# Patient Record
Sex: MALE | Race: WHITE | Employment: FULL TIME | URBAN - METROPOLITAN AREA
[De-identification: names, ages, dates, MRNs, and addresses within clinical notes are randomized per-mention and may not be internally consistent; named-entity substitution may affect disease eponyms.]

---

## 2018-11-26 ENCOUNTER — HOSPITAL ENCOUNTER (EMERGENCY)
Age: 63
Discharge: HOME | End: 2018-11-26
Attending: EMERGENCY MEDICINE
Payer: COMMERCIAL

## 2018-11-26 VITALS
TEMPERATURE: 97.8 F | DIASTOLIC BLOOD PRESSURE: 79 MMHG | OXYGEN SATURATION: 98 % | WEIGHT: 205 LBS | HEART RATE: 81 BPM | BODY MASS INDEX: 25.49 KG/M2 | RESPIRATION RATE: 18 BRPM | SYSTOLIC BLOOD PRESSURE: 134 MMHG | HEIGHT: 75 IN

## 2018-11-26 DIAGNOSIS — G58.9 MONONEUROPATHY: Primary | ICD-10-CM

## 2018-11-26 LAB
ALBUMIN SERPL-MCNC: 3.3 G/DL (ref 3.2–4.6)
ALBUMIN/GLOB SERPL: 0.8 {RATIO} (ref 1.2–3.5)
ALP SERPL-CCNC: 112 U/L (ref 50–136)
ALT SERPL-CCNC: 19 U/L (ref 12–65)
ANION GAP SERPL CALC-SCNC: 6 MMOL/L (ref 7–16)
AST SERPL-CCNC: 18 U/L (ref 15–37)
BASOPHILS # BLD: 0 K/UL (ref 0–0.2)
BASOPHILS NFR BLD: 0 % (ref 0–2)
BILIRUB SERPL-MCNC: 0.3 MG/DL (ref 0.2–1.1)
BUN SERPL-MCNC: 18 MG/DL (ref 8–23)
CALCIUM SERPL-MCNC: 8.7 MG/DL (ref 8.3–10.4)
CHLORIDE SERPL-SCNC: 108 MMOL/L (ref 98–107)
CO2 SERPL-SCNC: 27 MMOL/L (ref 21–32)
CREAT SERPL-MCNC: 0.66 MG/DL (ref 0.8–1.5)
DIFFERENTIAL METHOD BLD: NORMAL
EOSINOPHIL # BLD: 0.2 K/UL (ref 0–0.8)
EOSINOPHIL NFR BLD: 2 % (ref 0.5–7.8)
ERYTHROCYTE [DISTWIDTH] IN BLOOD BY AUTOMATED COUNT: 14.4 %
GLOBULIN SER CALC-MCNC: 3.9 G/DL (ref 2.3–3.5)
GLUCOSE SERPL-MCNC: 88 MG/DL (ref 65–100)
HCT VFR BLD AUTO: 45.4 % (ref 41.1–50.3)
HGB BLD-MCNC: 15 G/DL (ref 13.6–17.2)
IMM GRANULOCYTES # BLD: 0 K/UL (ref 0–0.5)
IMM GRANULOCYTES NFR BLD AUTO: 0 % (ref 0–5)
LYMPHOCYTES # BLD: 1.9 K/UL (ref 0.5–4.6)
LYMPHOCYTES NFR BLD: 21 % (ref 13–44)
MCH RBC QN AUTO: 31.5 PG (ref 26.1–32.9)
MCHC RBC AUTO-ENTMCNC: 33 G/DL (ref 31.4–35)
MCV RBC AUTO: 95.4 FL (ref 79.6–97.8)
MONOCYTES # BLD: 1 K/UL (ref 0.1–1.3)
MONOCYTES NFR BLD: 11 % (ref 4–12)
NEUTS SEG # BLD: 6 K/UL (ref 1.7–8.2)
NEUTS SEG NFR BLD: 66 % (ref 43–78)
NRBC # BLD: 0 K/UL (ref 0–0.2)
PLATELET # BLD AUTO: 219 K/UL (ref 150–450)
PMV BLD AUTO: 9.7 FL (ref 9.4–12.3)
POTASSIUM SERPL-SCNC: 4.4 MMOL/L (ref 3.5–5.1)
PROT SERPL-MCNC: 7.2 G/DL (ref 6.3–8.2)
RBC # BLD AUTO: 4.76 M/UL (ref 4.23–5.6)
SODIUM SERPL-SCNC: 141 MMOL/L (ref 136–145)
WBC # BLD AUTO: 9.1 K/UL (ref 4.3–11.1)

## 2018-11-26 PROCEDURE — 80053 COMPREHEN METABOLIC PANEL: CPT

## 2018-11-26 PROCEDURE — 99283 EMERGENCY DEPT VISIT LOW MDM: CPT | Performed by: EMERGENCY MEDICINE

## 2018-11-26 PROCEDURE — 85025 COMPLETE CBC W/AUTO DIFF WBC: CPT

## 2018-11-26 RX ORDER — HYDROCODONE BITARTRATE AND ACETAMINOPHEN 5; 325 MG/1; MG/1
1 TABLET ORAL
Qty: 15 TAB | Refills: 0 | Status: SHIPPED | OUTPATIENT
Start: 2018-11-26 | End: 2019-12-24

## 2018-11-26 NOTE — ED PROVIDER NOTES
Patient states he has been having right foot tingling and appears seizures off and on for the past several months. He states it has been constant for the last couple weeks. He denies any trauma or precipitating event. He went to an urgent care recently and was prescribed Ultram for his problem. He has been taking this without any improvement in his symptoms. He is not a diabetic, states he smokes cigarettes. He denies any redness or swelling to his foot. He states his symptoms are constant and worse when he walks when he plays golf. Elements of this note were created using speech recognition software. As such, errors of speech recognition may be present. History reviewed. No pertinent past medical history. History reviewed. No pertinent surgical history. History reviewed. No pertinent family history. Social History Socioeconomic History  Marital status: SINGLE Spouse name: Not on file  Number of children: Not on file  Years of education: Not on file  Highest education level: Not on file Social Needs  Financial resource strain: Not on file  Food insecurity - worry: Not on file  Food insecurity - inability: Not on file  Transportation needs - medical: Not on file  Transportation needs - non-medical: Not on file Occupational History  Not on file Tobacco Use  Smoking status: Current Every Day Smoker Types: Cigarettes  Smokeless tobacco: Never Used Substance and Sexual Activity  Alcohol use: Yes  Drug use: No  
 Sexual activity: Not on file Other Topics Concern  Not on file Social History Narrative  Not on file ALLERGIES: Patient has no known allergies. Review of Systems Constitutional: Negative for chills and fever. Gastrointestinal: Negative for nausea and vomiting. All other systems reviewed and are negative. Vitals:  
 11/26/18 1254 BP: 135/80 Pulse: 79 Resp: 20  
 Temp: 97.7 °F (36.5 °C) SpO2: 97% Weight: 93 kg (205 lb) Height: 6' 3\" (1.905 m) Physical Exam  
Constitutional: He is oriented to person, place, and time. He appears well-developed and well-nourished. HENT:  
Head: Normocephalic and atraumatic. Eyes: Conjunctivae are normal. Pupils are equal, round, and reactive to light. Neck: Normal range of motion. Neck supple. Cardiovascular: Intact distal pulses. Right foot with palpable posterior tibialis pulse. Dorsalis pedal pulse detected by Doppler, equal on right and left feet. Right foot is warm with normal coloration Musculoskeletal: Normal range of motion. He exhibits no edema. Neurological: He is alert and oriented to person, place, and time. Skin: Skin is warm and dry. Nursing note and vitals reviewed. MDM Number of Diagnoses or Management Options Mononeuropathy: new and requires workup Diagnosis management comments: 4:22 PM Discussed results with patient, unremarkable labs. He has a primary doctor he can follow up with. Amount and/or Complexity of Data Reviewed Clinical lab tests: ordered and reviewed Risk of Complications, Morbidity, and/or Mortality Presenting problems: moderate Diagnostic procedures: moderate Management options: moderate Patient Progress Patient progress: stable Procedures

## 2018-11-26 NOTE — ED TRIAGE NOTES
Pt to ed with c/o right foot numbness x1 month - pt reports that the it is a constant burning and numb feeling to his foot - pt denies any injuries to the area - pt reports that the burning is worse with ambulation - pt alert and oriented x3

## 2018-11-26 NOTE — ED NOTES
I have reviewed discharge instructions with the patient. The patient verbalized understanding. Patient left ED via Discharge Method: ambulatory to Home with self. Opportunity for questions and clarification provided. Patient given 1 scripts. No e-sign. To continue your aftercare when you leave the hospital, you may receive an automated call from our care team to check in on how you are doing. This is a free service and part of our promise to provide the best care and service to meet your aftercare needs.  If you have questions, or wish to unsubscribe from this service please call [phone]. Thank you for Choosing our [facility] Emergency Department.

## 2018-11-26 NOTE — DISCHARGE INSTRUCTIONS
Follow-up with your doctor. Call the office to make an appointment. Return to the emergency department if your symptoms worsen. You can take Motrin 600 mg every 6 hours as needed for pain in addition to the prescription medication.

## 2018-11-29 ENCOUNTER — HOSPITAL ENCOUNTER (OUTPATIENT)
Dept: ULTRASOUND IMAGING | Age: 63
Discharge: HOME | End: 2018-11-29
Attending: FAMILY MEDICINE
Payer: COMMERCIAL

## 2018-11-29 DIAGNOSIS — M79.604 PAIN OF RIGHT LOWER EXTREMITY: ICD-10-CM

## 2018-11-29 PROCEDURE — 93926 LOWER EXTREMITY STUDY: CPT

## 2019-12-24 ENCOUNTER — APPOINTMENT (OUTPATIENT)
Dept: ULTRASOUND IMAGING | Age: 64
End: 2019-12-24
Attending: EMERGENCY MEDICINE
Payer: COMMERCIAL

## 2019-12-24 ENCOUNTER — HOSPITAL ENCOUNTER (EMERGENCY)
Age: 64
Discharge: OTHER INSTITUTION | End: 2019-12-24
Attending: EMERGENCY MEDICINE
Payer: COMMERCIAL

## 2019-12-24 VITALS
SYSTOLIC BLOOD PRESSURE: 135 MMHG | DIASTOLIC BLOOD PRESSURE: 77 MMHG | WEIGHT: 190 LBS | HEART RATE: 97 BPM | BODY MASS INDEX: 23.62 KG/M2 | TEMPERATURE: 97.9 F | OXYGEN SATURATION: 95 % | HEIGHT: 75 IN

## 2019-12-24 DIAGNOSIS — I70.209 ARTERIAL OCCLUSION, LOWER EXTREMITY (HCC): Primary | ICD-10-CM

## 2019-12-24 LAB
ALBUMIN SERPL-MCNC: 3.2 G/DL (ref 3.2–4.6)
ALBUMIN/GLOB SERPL: 0.9 {RATIO} (ref 1.2–3.5)
ALP SERPL-CCNC: 96 U/L (ref 50–136)
ALT SERPL-CCNC: 23 U/L (ref 12–65)
ANION GAP SERPL CALC-SCNC: 5 MMOL/L (ref 7–16)
AST SERPL-CCNC: 50 U/L (ref 15–37)
BASOPHILS # BLD: 0.1 K/UL (ref 0–0.2)
BASOPHILS NFR BLD: 1 % (ref 0–2)
BILIRUB SERPL-MCNC: 0.3 MG/DL (ref 0.2–1.1)
BUN SERPL-MCNC: 23 MG/DL (ref 8–23)
CALCIUM SERPL-MCNC: 8.6 MG/DL (ref 8.3–10.4)
CHLORIDE SERPL-SCNC: 108 MMOL/L (ref 98–107)
CO2 SERPL-SCNC: 26 MMOL/L (ref 21–32)
CREAT SERPL-MCNC: 0.95 MG/DL (ref 0.8–1.5)
DIFFERENTIAL METHOD BLD: NORMAL
EOSINOPHIL # BLD: 0.1 K/UL (ref 0–0.8)
EOSINOPHIL NFR BLD: 2 % (ref 0.5–7.8)
ERYTHROCYTE [DISTWIDTH] IN BLOOD BY AUTOMATED COUNT: 14.4 % (ref 11.9–14.6)
GLOBULIN SER CALC-MCNC: 3.5 G/DL (ref 2.3–3.5)
GLUCOSE SERPL-MCNC: 107 MG/DL (ref 65–100)
HCT VFR BLD AUTO: 44 % (ref 41.1–50.3)
HGB BLD-MCNC: 14.3 G/DL (ref 13.6–17.2)
IMM GRANULOCYTES # BLD AUTO: 0 K/UL (ref 0–0.5)
IMM GRANULOCYTES NFR BLD AUTO: 0 % (ref 0–5)
LYMPHOCYTES # BLD: 1.6 K/UL (ref 0.5–4.6)
LYMPHOCYTES NFR BLD: 18 % (ref 13–44)
MCH RBC QN AUTO: 30.9 PG (ref 26.1–32.9)
MCHC RBC AUTO-ENTMCNC: 32.5 G/DL (ref 31.4–35)
MCV RBC AUTO: 95 FL (ref 79.6–97.8)
MONOCYTES # BLD: 1 K/UL (ref 0.1–1.3)
MONOCYTES NFR BLD: 11 % (ref 4–12)
NEUTS SEG # BLD: 5.9 K/UL (ref 1.7–8.2)
NEUTS SEG NFR BLD: 68 % (ref 43–78)
NRBC # BLD: 0 K/UL (ref 0–0.2)
PLATELET # BLD AUTO: 203 K/UL (ref 150–450)
PMV BLD AUTO: 9.7 FL (ref 9.4–12.3)
POTASSIUM SERPL-SCNC: 4.3 MMOL/L (ref 3.5–5.1)
PROT SERPL-MCNC: 6.7 G/DL (ref 6.3–8.2)
RBC # BLD AUTO: 4.63 M/UL (ref 4.23–5.6)
SODIUM SERPL-SCNC: 139 MMOL/L (ref 136–145)
WBC # BLD AUTO: 8.6 K/UL (ref 4.3–11.1)

## 2019-12-24 PROCEDURE — 93922 UPR/L XTREMITY ART 2 LEVELS: CPT

## 2019-12-24 PROCEDURE — 80053 COMPREHEN METABOLIC PANEL: CPT

## 2019-12-24 PROCEDURE — 99284 EMERGENCY DEPT VISIT MOD MDM: CPT | Performed by: EMERGENCY MEDICINE

## 2019-12-24 PROCEDURE — 85025 COMPLETE CBC W/AUTO DIFF WBC: CPT

## 2019-12-24 NOTE — ED TRIAGE NOTES
Pt states he has left leg pain, states the leg is cool to touch, hx of vascular surgery in right leg in 12/2018.

## 2019-12-24 NOTE — ED PROVIDER NOTES
Patient with peripheral vascular disease. December 2018 had surgery for right lower extremity arterial occlusion. Yesterday had left lower leg pain along with coldness and unable to feel pulses. Symptoms continued all night to this morning so came in. No chest pain or shortness of breath. No abdominal pain. Similar to previous episode in the right lower extremity. The history is provided by the patient. No  was used. Leg Pain    This is a new problem. The current episode started yesterday. The problem occurs constantly. The problem has been gradually worsening. The pain is present in the left lower leg. The quality of the pain is described as aching. The pain is moderate. Pertinent negatives include no numbness, full range of motion, no back pain and no neck pain. The symptoms are aggravated by palpation and standing. He has tried nothing for the symptoms. There has been no history of extremity trauma. No past medical history on file. No past surgical history on file. No family history on file. Social History     Socioeconomic History    Marital status: SINGLE     Spouse name: Not on file    Number of children: Not on file    Years of education: Not on file    Highest education level: Not on file   Occupational History    Not on file   Social Needs    Financial resource strain: Not on file    Food insecurity:     Worry: Not on file     Inability: Not on file    Transportation needs:     Medical: Not on file     Non-medical: Not on file   Tobacco Use    Smoking status: Current Every Day Smoker     Types: Cigarettes    Smokeless tobacco: Never Used   Substance and Sexual Activity    Alcohol use:  Yes    Drug use: No    Sexual activity: Not on file   Lifestyle    Physical activity:     Days per week: Not on file     Minutes per session: Not on file    Stress: Not on file   Relationships    Social connections:     Talks on phone: Not on file     Gets together: Not on file     Attends [employer] service: Not on file     Active member of club or organization: Not on file     Attends meetings of clubs or organizations: Not on file     Relationship status: Not on file    Intimate partner violence:     Fear of current or ex partner: Not on file     Emotionally abused: Not on file     Physically abused: Not on file     Forced sexual activity: Not on file   Other Topics Concern    Not on file   Social History Narrative    Not on file         ALLERGIES: Patient has no known allergies. Review of Systems   Constitutional: Negative for chills and fever. HENT: Negative for rhinorrhea and sore throat. Eyes: Negative for pain and redness. Respiratory: Negative for chest tightness, shortness of breath and wheezing. Cardiovascular: Negative for chest pain and leg swelling. Gastrointestinal: Negative for abdominal pain, diarrhea, nausea and vomiting. Genitourinary: Negative for dysuria and hematuria. Musculoskeletal: Negative for back pain, gait problem, neck pain and neck stiffness. Skin: Negative for color change and rash. Neurological: Negative for weakness, numbness and headaches. Vitals:    12/24/19 0734 12/24/19 0735 12/24/19 0737   BP: 121/76     Temp:  97.9 °F (36.6 °C)    SpO2:   97%   Weight:  86.2 kg (190 lb)    Height:  6' 3\" (1.905 m)             Physical Exam  Constitutional:       Appearance: Normal appearance. He is well-developed. HENT:      Head: Normocephalic and atraumatic. Neck:      Musculoskeletal: Normal range of motion and neck supple. Cardiovascular:      Rate and Rhythm: Normal rate and regular rhythm. Pulmonary:      Effort: Pulmonary effort is normal.      Breath sounds: Normal breath sounds. Abdominal:      General: Bowel sounds are normal.      Palpations: Abdomen is soft. Tenderness: There is no tenderness. Musculoskeletal: Normal range of motion. Skin:     General: Skin is dry.       Comments: VANNESAE cold to touch. Unable to palpate pedal pulse. Cap refill slowed at all toes. TTP in left calf. Neurological:      General: No focal deficit present. Mental Status: He is alert and oriented to person, place, and time. MDM  Number of Diagnoses or Management Options  Diagnosis management comments: Ultrasound shows left lower extremity proximal arterial occlusion. Discussed with Dr. Remy Harper at [address] who did patient's previous surgery and request transfer to the ER for further evaluation and possible surgery today. Amount and/or Complexity of Data Reviewed  Clinical lab tests: ordered and reviewed  Tests in the radiology section of CPT®: ordered and reviewed    Patient Progress  Patient progress: stable         Procedures          Results Include:    Recent Results (from the past 24 hour(s))   CBC WITH AUTOMATED DIFF    Collection Time: 12/24/19  7:53 AM   Result Value Ref Range    WBC 8.6 4.3 - 11.1 K/uL    RBC 4.63 4.23 - 5.6 M/uL    HGB 14.3 13.6 - 17.2 g/dL    HCT 44.0 41.1 - 50.3 %    MCV 95.0 79.6 - 97.8 FL    MCH 30.9 26.1 - 32.9 PG    MCHC 32.5 31.4 - 35.0 g/dL    RDW 14.4 11.9 - 14.6 %    PLATELET 479 000 - 237 K/uL    MPV 9.7 9.4 - 12.3 FL    ABSOLUTE NRBC 0.00 0.0 - 0.2 K/uL    DF AUTOMATED      NEUTROPHILS 68 43 - 78 %    LYMPHOCYTES 18 13 - 44 %    MONOCYTES 11 4.0 - 12.0 %    EOSINOPHILS 2 0.5 - 7.8 %    BASOPHILS 1 0.0 - 2.0 %    IMMATURE GRANULOCYTES 0 0.0 - 5.0 %    ABS. NEUTROPHILS 5.9 1.7 - 8.2 K/UL    ABS. LYMPHOCYTES 1.6 0.5 - 4.6 K/UL    ABS. MONOCYTES 1.0 0.1 - 1.3 K/UL    ABS. EOSINOPHILS 0.1 0.0 - 0.8 K/UL    ABS. BASOPHILS 0.1 0.0 - 0.2 K/UL    ABS. IMM.  GRANS. 0.0 0.0 - 0.5 K/UL   METABOLIC PANEL, COMPREHENSIVE    Collection Time: 12/24/19  7:53 AM   Result Value Ref Range    Sodium 139 136 - 145 mmol/L    Potassium 4.3 3.5 - 5.1 mmol/L    Chloride 108 (H) 98 - 107 mmol/L    CO2 26 21 - 32 mmol/L    Anion gap 5 (L) 7 - 16 mmol/L    Glucose 107 (H) 65 - 100 mg/dL BUN 23 8 - 23 MG/DL    Creatinine 0.95 0.8 - 1.5 MG/DL    GFR est AA >60 >60 ml/min/1.73m2    GFR est non-AA >60 >60 ml/min/1.73m2    Calcium 8.6 8.3 - 10.4 MG/DL    Bilirubin, total 0.3 0.2 - 1.1 MG/DL    ALT (SGPT) 23 12 - 65 U/L    AST (SGOT) 50 (H) 15 - 37 U/L    Alk. phosphatase 96 50 - 136 U/L    Protein, total 6.7 6.3 - 8.2 g/dL    Albumin 3.2 3.2 - 4.6 g/dL    Globulin 3.5 2.3 - 3.5 g/dL    A-G Ratio 0.9 (L) 1.2 - 3.5        US shows LLE proximal arterial occlusion.

## 2019-12-24 NOTE — ED NOTES
Pt transferred to [facility] ED by [address] [facility]

## 2021-09-23 ENCOUNTER — HOSPITAL ENCOUNTER (INPATIENT)
Age: 66
LOS: 1 days | Discharge: ANOTHER HOSPITAL | DRG: 301 | End: 2021-09-23
Attending: EMERGENCY MEDICINE | Admitting: SURGERY
Payer: COMMERCIAL

## 2021-09-23 ENCOUNTER — APPOINTMENT (OUTPATIENT)
Dept: ULTRASOUND IMAGING | Age: 66
DRG: 301 | End: 2021-09-23
Attending: EMERGENCY MEDICINE
Payer: COMMERCIAL

## 2021-09-23 VITALS
HEART RATE: 64 BPM | HEIGHT: 75 IN | DIASTOLIC BLOOD PRESSURE: 64 MMHG | RESPIRATION RATE: 18 BRPM | TEMPERATURE: 98.3 F | OXYGEN SATURATION: 93 % | WEIGHT: 220 LBS | SYSTOLIC BLOOD PRESSURE: 127 MMHG | BODY MASS INDEX: 27.35 KG/M2

## 2021-09-23 PROBLEM — I73.9 PAD (PERIPHERAL ARTERY DISEASE) (HCC): Status: ACTIVE | Noted: 2021-09-23

## 2021-09-23 PROBLEM — I99.8 ISCHEMIA OF LEFT LOWER EXTREMITY: Status: ACTIVE | Noted: 2021-09-23

## 2021-09-23 LAB
ALBUMIN SERPL-MCNC: 3.3 G/DL (ref 3.2–4.6)
ALBUMIN/GLOB SERPL: 0.8 {RATIO} (ref 1.2–3.5)
ALP SERPL-CCNC: 95 U/L (ref 50–136)
ALT SERPL-CCNC: 14 U/L (ref 12–65)
ANION GAP SERPL CALC-SCNC: 6 MMOL/L (ref 7–16)
APTT PPP: 26.7 SEC (ref 24.1–35.1)
AST SERPL-CCNC: 11 U/L (ref 15–37)
BASOPHILS # BLD: 0.1 K/UL (ref 0–0.2)
BASOPHILS NFR BLD: 1 % (ref 0–2)
BILIRUB SERPL-MCNC: 0.4 MG/DL (ref 0.2–1.1)
BUN SERPL-MCNC: 13 MG/DL (ref 8–23)
CALCIUM SERPL-MCNC: 9 MG/DL (ref 8.3–10.4)
CHLORIDE SERPL-SCNC: 107 MMOL/L (ref 98–107)
CO2 SERPL-SCNC: 27 MMOL/L (ref 21–32)
CREAT SERPL-MCNC: 0.56 MG/DL (ref 0.8–1.5)
DIFFERENTIAL METHOD BLD: ABNORMAL
EOSINOPHIL # BLD: 0.1 K/UL (ref 0–0.8)
EOSINOPHIL NFR BLD: 1 % (ref 0.5–7.8)
ERYTHROCYTE [DISTWIDTH] IN BLOOD BY AUTOMATED COUNT: 14.5 % (ref 11.9–14.6)
GLOBULIN SER CALC-MCNC: 3.9 G/DL (ref 2.3–3.5)
GLUCOSE SERPL-MCNC: 83 MG/DL (ref 65–100)
HCT VFR BLD AUTO: 47.4 % (ref 41.1–50.3)
HGB BLD-MCNC: 15.3 G/DL (ref 13.6–17.2)
IMM GRANULOCYTES # BLD AUTO: 0 K/UL (ref 0–0.5)
IMM GRANULOCYTES NFR BLD AUTO: 0 % (ref 0–5)
LYMPHOCYTES # BLD: 2.1 K/UL (ref 0.5–4.6)
LYMPHOCYTES NFR BLD: 24 % (ref 13–44)
MCH RBC QN AUTO: 31.8 PG (ref 26.1–32.9)
MCHC RBC AUTO-ENTMCNC: 32.3 G/DL (ref 31.4–35)
MCV RBC AUTO: 98.5 FL (ref 79.6–97.8)
MONOCYTES # BLD: 0.8 K/UL (ref 0.1–1.3)
MONOCYTES NFR BLD: 9 % (ref 4–12)
NEUTS SEG # BLD: 5.8 K/UL (ref 1.7–8.2)
NEUTS SEG NFR BLD: 65 % (ref 43–78)
NRBC # BLD: 0 K/UL (ref 0–0.2)
PLATELET # BLD AUTO: 226 K/UL (ref 150–450)
PMV BLD AUTO: 9.8 FL (ref 9.4–12.3)
POTASSIUM SERPL-SCNC: 4.2 MMOL/L (ref 3.5–5.1)
PROT SERPL-MCNC: 7.2 G/DL (ref 6.3–8.2)
RBC # BLD AUTO: 4.81 M/UL (ref 4.23–5.6)
SODIUM SERPL-SCNC: 140 MMOL/L (ref 138–145)
WBC # BLD AUTO: 8.9 K/UL (ref 4.3–11.1)

## 2021-09-23 PROCEDURE — 80053 COMPREHEN METABOLIC PANEL: CPT

## 2021-09-23 PROCEDURE — 99283 EMERGENCY DEPT VISIT LOW MDM: CPT

## 2021-09-23 PROCEDURE — 85025 COMPLETE CBC W/AUTO DIFF WBC: CPT

## 2021-09-23 PROCEDURE — 93922 UPR/L XTREMITY ART 2 LEVELS: CPT

## 2021-09-23 PROCEDURE — 74011250636 HC RX REV CODE- 250/636: Performed by: EMERGENCY MEDICINE

## 2021-09-23 PROCEDURE — 65270000029 HC RM PRIVATE

## 2021-09-23 PROCEDURE — 85730 THROMBOPLASTIN TIME PARTIAL: CPT

## 2021-09-23 RX ORDER — HYDROMORPHONE HYDROCHLORIDE 1 MG/ML
1 INJECTION, SOLUTION INTRAMUSCULAR; INTRAVENOUS; SUBCUTANEOUS
Status: COMPLETED | OUTPATIENT
Start: 2021-09-23 | End: 2021-09-23

## 2021-09-23 RX ORDER — HEPARIN SODIUM 5000 [USP'U]/ML
80 INJECTION, SOLUTION INTRAVENOUS; SUBCUTANEOUS ONCE
Status: COMPLETED | OUTPATIENT
Start: 2021-09-23 | End: 2021-09-23

## 2021-09-23 RX ORDER — HEPARIN SODIUM 5000 [USP'U]/100ML
18-36 INJECTION, SOLUTION INTRAVENOUS
Status: DISCONTINUED | OUTPATIENT
Start: 2021-09-23 | End: 2021-09-23 | Stop reason: HOSPADM

## 2021-09-23 RX ORDER — POLYETHYLENE GLYCOL 3350 17 G/17G
17 POWDER, FOR SOLUTION ORAL DAILY PRN
Status: DISCONTINUED | OUTPATIENT
Start: 2021-09-23 | End: 2021-09-23

## 2021-09-23 RX ORDER — SODIUM CHLORIDE 0.9 % (FLUSH) 0.9 %
5-40 SYRINGE (ML) INJECTION EVERY 8 HOURS
Status: DISCONTINUED | OUTPATIENT
Start: 2021-09-23 | End: 2021-09-23 | Stop reason: HOSPADM

## 2021-09-23 RX ORDER — ONDANSETRON 2 MG/ML
4 INJECTION INTRAMUSCULAR; INTRAVENOUS
Status: COMPLETED | OUTPATIENT
Start: 2021-09-23 | End: 2021-09-23

## 2021-09-23 RX ORDER — GUAIFENESIN 100 MG/5ML
81 LIQUID (ML) ORAL DAILY
Status: DISCONTINUED | OUTPATIENT
Start: 2021-09-24 | End: 2021-09-23

## 2021-09-23 RX ORDER — SODIUM CHLORIDE 0.9 % (FLUSH) 0.9 %
5-40 SYRINGE (ML) INJECTION AS NEEDED
Status: DISCONTINUED | OUTPATIENT
Start: 2021-09-23 | End: 2021-09-23

## 2021-09-23 RX ORDER — ACETAMINOPHEN 650 MG/1
650 SUPPOSITORY RECTAL
Status: DISCONTINUED | OUTPATIENT
Start: 2021-09-23 | End: 2021-09-23

## 2021-09-23 RX ORDER — ONDANSETRON 4 MG/1
4 TABLET, ORALLY DISINTEGRATING ORAL
Status: DISCONTINUED | OUTPATIENT
Start: 2021-09-23 | End: 2021-09-23

## 2021-09-23 RX ORDER — ONDANSETRON 2 MG/ML
4 INJECTION INTRAMUSCULAR; INTRAVENOUS
Status: DISCONTINUED | OUTPATIENT
Start: 2021-09-23 | End: 2021-09-23

## 2021-09-23 RX ORDER — ACETAMINOPHEN 325 MG/1
650 TABLET ORAL
Status: DISCONTINUED | OUTPATIENT
Start: 2021-09-23 | End: 2021-09-23

## 2021-09-23 RX ADMIN — ONDANSETRON 4 MG: 2 INJECTION INTRAMUSCULAR; INTRAVENOUS at 17:53

## 2021-09-23 RX ADMIN — HEPARIN SODIUM AND DEXTROSE 18 UNITS/KG/HR: 5000; 5 INJECTION INTRAVENOUS at 17:58

## 2021-09-23 RX ADMIN — HEPARIN SODIUM 8000 UNITS: 5000 INJECTION INTRAVENOUS; SUBCUTANEOUS at 17:57

## 2021-09-23 RX ADMIN — HYDROMORPHONE HYDROCHLORIDE 1 MG: 1 INJECTION, SOLUTION INTRAMUSCULAR; INTRAVENOUS; SUBCUTANEOUS at 17:52

## 2021-09-23 NOTE — Clinical Note
Status[de-identified] INPATIENT [101]   Type of Bed: Medical [8]   Inpatient Hospitalization Certified Necessary for the Following Reasons: 3.  Patient receiving treatment that can only be provided in an inpatient setting (further clarification in H&P documentation)   Admitting Diagnosis: PAD (peripheral artery disease) [facility]) [[id number]]   Admitting Diagnosis: Ischemia of left lower extremity [[id number]]   Admitting Physician: Elsa Butts   Attending Physician: Elsa Butts   Estimated Length of Stay: 2 Midnights   Discharge Plan[de-identified] Home with Office Follow-up

## 2021-09-23 NOTE — H&P
[address]   [address] [facility] [address] FAX: [phone]    Vascular Surgery Consult    Subjective:      Tish Marino is a 61 y.o. male who presented to the ER for evaluation of left leg pain, numbness and coldness that started yesterday around 2 pm. He is followed by Dr. Sara Myers at [facility]. He apparently called their office but was advised that he needed to present to the urgent care as they could not see him in the office. He has undergone a right fem-pop bypass with GSV and left SFA stenosis s/p radial access with right bypass outflow 4mm angioplasty and left SFA 5mm angioplasty in Nov 2020. He had been on Coumadin and Plavix but it has been discontinued and he is only on ASA. He unfortuntaly continues to smoke and has no desire to stop. He is having extreme pain and numbness. He does get some mild improvement to his pain when he dangles his left leg off the bed. He denies COVID-19 and has not been vaccinated. PMH: PAD s/p bypass and anioplasty, Osgood-Schlatter's disease      No past medical history on file. No past surgical history on file. No family history on file. Social History     Socioeconomic History    Marital status: SINGLE     Spouse name: Not on file    Number of children: Not on file    Years of education: Not on file    Highest education level: Not on file   Tobacco Use    Smoking status: Current Every Day Smoker     Types: Cigarettes    Smokeless tobacco: Never Used   Substance and Sexual Activity    Alcohol use: Yes    Drug use: No     Social Determinants of Health     Financial Resource Strain:     Difficulty of Paying Living Expenses:    Food Insecurity:     Worried About Running Out of Food in the Last Year:     [address] in the Last Year:    Transportation Needs:     Lack of Transportation (Medical):      Lack of Transportation (Non-Medical):    Physical Activity:     Days of Exercise per Week:     Minutes of Exercise per Session:    Stress:     Feeling of Stress :    Social Connections:     Frequency of Communication with Friends and Family:     Frequency of Social Gatherings with Friends and Family:     Attends Christian Services:     Active Member of Clubs or Organizations:     Attends Club or Organization Meetings:     Marital Status:       Current Facility-Administered Medications   Medication Dose Route Frequency    heparin (porcine) injection 8,000 Units  80 Units/kg IntraVENous ONCE    heparin 25,000 units in dextrose 500 mL infusion  18-36 Units/kg/hr IntraVENous TITRATE     No current outpatient medications on file. No Known Allergies    Review of Systems:  A comprehensive review of systems was negative except for that written in the History of Present Illness. Objective:     Patient Vitals for the past 8 hrs:   BP Temp Pulse Resp SpO2 Height Weight   21 1500 116/81 97.8 °F (36.6 °C) 77 18 97 % 6' 3\" (1.905 m) 220 lb (99.8 kg)     Temp (24hrs), Av.8 °F (36.6 °C), Min:97.8 °F (36.6 °C), Max:97.8 °F (36.6 °C)      Physical Exam:  GENERAL: alert, cooperative, no distress, appears stated age, LUNG: clear to auscultation bilaterally, HEART: regular rate and rhythm, S1, S2 normal, no murmur, click, rub or gallop, EXTREMITIES:  no edema, right foot warm, well perfused. Left lower leg and foot cool, non-palpable pulses on the left, decreased sensory, motor is intact    Assessment/Plan:      Patient is a 61year old male who presented with left leg pain, numbness and coolness that started yesterday unable to be seen by his primary vascular surgeon at [facility]. The ultrasound shows that his  femoral artery, popliteal, PTA and peroneal are all occluded on the left. Given that the patient has had multiple vascular interventions at [facility]. Dr. Renate Knight has spoken with Dr. Viviana Thompson and who has graciously agreed to accept him as a transfer to [facility] to address his left lower extremity ischemia.     Patient seen and examined with Dr. Melly Temple. Signed By: Herrera Hines NP     September 23, 2021       Elements of this note have been dictated using speech recognition software. As a result, errors of speech recognition may have occurred.

## 2021-09-23 NOTE — ED NOTES
TRANSFER - OUT REPORT:    Verbal report given to Jimena Vásquez RN on Clement Gonzales  being transferred to [facility] [address] for urgent transfer       Report consisted of patients Situation, Background, Assessment and   Recommendations(SBAR). Information from the following report(s) SBAR was reviewed with the receiving nurse. Lines:   Peripheral IV 09/23/21 Left Antecubital (Active)        Opportunity for questions and clarification was provided.       Patient transported with:   Vizibility

## 2021-09-23 NOTE — ED TRIAGE NOTES
Pt ambulatory unassisted to triage with mask in place. Pt complains of L leg pain onset yesterday. Pt reports he was working, standing on his feet when the pain had a sudden onset. Hx of arterial blood clots in both legs most recent last year. L leg +palor and cool to touch. L popliteal pulse detected via doppler.

## 2021-09-23 NOTE — PROGRESS NOTES
Patient seen and examined. Patient presented to the ER after 24 hours of pain in his left leg. He has a history of peripheral vascular disease status post right lower extremity bypass at [address] [facility]. Clinically patient has palpable femoral pulses bilaterally his left foot is cool however motor and sensory is intact. He does not have any pain in his calf right foot is warm. Duplex studies show SFA and popliteal artery occlusion with minimal flow in his tibial vessels. My plan will be admit the patient for IV heparin and plan for thrombolysis tomorrow morning. Patient wants to go to [facility] for surgery as he had multiple surgeries there. I discussed with the on-call vascular surgery at [facility] who agreed to accept him. We will continue IV heparin to help with vasodilatation and pain control. Patient is aware with with occlusion he is a high risk for potential limb loss.     Lorence Aase

## 2021-09-23 NOTE — ED PROVIDER NOTES
28-year-old male with history of peripheral artery disease presents with acute onset of left lower leg pain foot pain and numbness yesterday around 2 or 2:30 PM.  Was slightly improved last night when he would hang it off the bed or D elevated. Worsening pain and numbness today. Patient's vascular surgeon is at [facility] but he would like to try vascular surgery here at [facility]. He denies a history of aortic aneurysm. He continues to smoke and takes a 81 mg aspirin daily. No chest pain or trouble breathing. No direct trauma to the leg. The foot feels cold. The history is provided by the patient. Leg Pain   This is a new problem. The current episode started yesterday. The problem occurs constantly. The problem has been gradually worsening. Pain location: Left leg below the knee. The quality of the pain is described as aching. The pain is severe. Associated symptoms include numbness ( Left foot is numb). Pertinent negatives include full range of motion, no tingling, no back pain and no neck pain. He has tried nothing for the symptoms. There has been no history of extremity trauma. No past medical history on file. No past surgical history on file. No family history on file. Social History     Socioeconomic History    Marital status: SINGLE     Spouse name: Not on file    Number of children: Not on file    Years of education: Not on file    Highest education level: Not on file   Occupational History    Not on file   Tobacco Use    Smoking status: Current Every Day Smoker     Types: Cigarettes    Smokeless tobacco: Never Used   Substance and Sexual Activity    Alcohol use:  Yes    Drug use: No    Sexual activity: Not on file   Other Topics Concern    Not on file   Social History Narrative    Not on file     Social Determinants of Health     Financial Resource Strain:     Difficulty of Paying Living Expenses:    Food Insecurity:     Worried About Running Out of Food in the Last Year:     Ran Out of Food in the Last Year:    Transportation Needs:     Lack of Transportation (Medical):  Lack of Transportation (Non-Medical):    Physical Activity:     Days of Exercise per Week:     Minutes of Exercise per Session:    Stress:     Feeling of Stress :    Social Connections:     Frequency of Communication with Friends and Family:     Frequency of Social Gatherings with Friends and Family:     Attends Rastafari Services:     Active Member of Clubs or Organizations:     Attends Club or Organization Meetings:     Marital Status:    Intimate Partner Violence:     Fear of Current or Ex-Partner:     Emotionally Abused:     Physically Abused:     Sexually Abused: ALLERGIES: Patient has no known allergies. Review of Systems   Constitutional: Negative for chills and fever. HENT: Negative for congestion and rhinorrhea. Respiratory: Negative for cough and shortness of breath. Cardiovascular: Negative for chest pain and leg swelling. Gastrointestinal: Negative for abdominal pain, nausea and vomiting. Musculoskeletal: Negative for back pain and neck pain. Skin: Positive for color change and pallor ( Left foot has less color and feels cold). Neurological: Positive for numbness ( Left foot is numb). Negative for tingling and weakness. Vitals:    09/23/21 1500   BP: 116/81   Pulse: 77   Resp: 18   Temp: 97.8 °F (36.6 °C)   SpO2: 97%   Height: 6' 3\" (1.905 m)            Physical Exam  Vitals and nursing note reviewed. Constitutional:       Appearance: He is well-developed. HENT:      Mouth/Throat:      Pharynx: No oropharyngeal exudate. Eyes:      Conjunctiva/sclera: Conjunctivae normal.      Pupils: Pupils are equal, round, and reactive to light. Cardiovascular:      Rate and Rhythm: Normal rate and regular rhythm. Pulses:           Carotid pulses are 2+ on the right side and 2+ on the left side.        Radial pulses are 2+ on the right side and 2+ on the left side. Femoral pulses are 2+ on the right side and 1+ on the left side. Popliteal pulses are 0 on the left side. Dorsalis pedis pulses are 1+ on the right side and 0 on the left side. Posterior tibial pulses are 1+ on the right side and 0 on the left side. Heart sounds: Normal heart sounds. Comments: Unable to detect posterior tibial or dorsalis pedis pulse with Doppler. Popliteal pulse appreciated in triage. Pulmonary:      Effort: Pulmonary effort is normal.      Breath sounds: Normal breath sounds. Abdominal:      General: Bowel sounds are normal. There is no distension. Palpations: Abdomen is soft. Tenderness: There is no abdominal tenderness. There is no guarding or rebound. Musculoskeletal:         General: No swelling or tenderness. Normal range of motion. Cervical back: Neck supple. Right lower leg: No edema. Left lower leg: No edema. Lymphadenopathy:      Cervical: No cervical adenopathy. Skin:     General: Skin is warm and dry. Coloration: Skin is pale ( Left foot feels more pale than the right foot). Neurological:      Mental Status: He is alert and oriented to person, place, and time. MDM  Number of Diagnoses or Management Options  Diagnosis management comments: Ischemic left foot. Arterial duplex ordered. Will discuss with vascular surgery. 4:31 PM  Immediately after my attempts at operating a pulse patient was taken to get a arterial duplex of the left leg. I have communicated with vascular surgery and the vascular surgery PA will go see the patient over an ultrasound. Upon his return we will obtain an IV check labs and start heparin bolus and drip.    5:33 PM  Lorence Aase with vascular surgery has seen the patient and talked with his vascular surgeon at [facility]. Dr. Lester Bedolla is on-call for the patient's vascular surgeon has agreed to accept him in transfer.   I will speak to him myself through the referral line and fill out the EMTALA form. Apparently they have a bed for him already. Amount and/or Complexity of Data Reviewed  Clinical lab tests: ordered and reviewed  Tests in the radiology section of CPT®: ordered and reviewed    Risk of Complications, Morbidity, and/or Mortality  Presenting problems: high  Diagnostic procedures: low  Management options: high  General comments: CRITICAL CARE Documentation: This patient is critically ill and there is a high probability of of imminent or life threatening deterioration in the patient's condition without immediate management. The nature of the patient's clinical problem is: Ischemic left foot, acute arterial occlusion    I have spent 35 minutes in direct patient care, documentation, review of labs/xrays/old records, discussion with Staff, vascular surgeon, his PA and accepting vascular surgeon at [facility]. .     The time involved in the performance of separately reportable procedures was not counted toward critical care time.      Dwight Ahuja MD; 9/23/2021 @5:34 PM        Patient Progress  Patient progress: (Serious condition)         Procedures

## 2021-09-24 NOTE — ADT AUTH CERT NOTES
INPATIENT ADMISSION NOTIF     ADMISSION DATE 21    UR CONTACT   JALEN GOMEZ     UR [phone]  UR [facility] [phone]    [address] [facility]     FACILITY NPI :[id number]  FACILITY TAX ID : [id number]     [facility] SPECIALTY HOSPTIAL [facility] EMERGENCY DEPT  [address] [address] [phone]               Insurance Plan Payor: BLUE CROSS    Primary Coverage Subscriber ID : [id number]           Current Patient Class : INPATIENT  Admit Date : 2021     REQUESTED LEVEL OF CARE:                                                            Diagnosis : PAD (peripheral artery disease) AND Ischemia of left lower extremity                          ICD10 Code :  [facility]) [I73.9] AND  [I99.8]       Admitting and Attending Info:  Admitting Provider : Laurel Elizondo MD   NPI: [id number]  Admitting Provider Phone. [phone]  Admitting Provider Address: SAME AS FACILITY          Patient Demographics    Patient Name   Terryann Fothergill Legal Sex   Male    1955 Address   [address] [address] Phone   [phone] [facility]   [phone] [facility]   Hospital Account    Name Acct ID Class Status Primary Coverage   Terryann Fothergill [id number] INPATIENT Discharged/Not Billed [facility] [address]          Guarantor Account (for Hospital Account [de-identified])    Name Relation to Pt Service Area Active?  Acct Type   Terryann Fothergill [facility] Yes Personal/Family   Address Phone     [address], [address] [phone])            Coverage Information (for Hospital Account [de-identified])    F/O Payor/Plan Subscriber  Subscriber Sex Precert #   RUBEN UGALDE/SC Marco Antonio UGALDE [facility] [place] 59 M    Subscriber Subscriber #   Terryann Fothergill [id number]   Grp # Group Name   [id number]    Address Phone   [address]    Policy Number Status Effective Date Benefits Phone [id number] -  -   Auth/Cert             Admission Information    Arrival Date/Time: 2021 1356 Admit Date/Time: 2021 1550 IP Adm.  Date/Time: 2021 1702   Admission Type: Emergency Point of Origin: Non-health Care Facility/self Admit Category: Home   Means of Arrival: Car Primary Service: Medicine Secondary Service: N/A   Transfer Source:  Service Area: [facility] WEST Unit: [facility] EMERGENCY DEPT   Admit Provider: Elysia Walden MD Attending Provider: Lynnette Hahn MD Referring Provider:    Admission Information    Attending Provider Admission Dx Admitted on    PAD (peripheral artery disease) ([facility] [address] Ischemia of left lower extremity 21   Service Isolation Code Status   MEDICINE  Prior   Allergies Advance Care Planning    No Known Allergies Jump to the Activity     Admission Information    Unit/Bed: [facility] EMERGENCY DEPT/H Service: MEDICINE   Admitting provider: Elysia Walden MD Phone: [phone]   Attending provider:  Phone:    PCP: Unknown Phone:    Admission dx:  Patient class: I   Admission type: ER     Patient Demographics    Patient Name   Katherine Engel   [id number] Legal Sex   Male    1955 Address   [address] Phone   [phone] (Home)   [phone] (Mobile)   H&P Notes     H&P by Shea, Tyrel Campbell NP at 21 1651 documented on ED from 2021 in [facility] EMERGENCY DEPT  Author: Pato Jacobs NP Author Type: Nurse Practitioner Filed: 21 173   Note Status: Signed Cosign: Cosigned by Elysia Walden MD at 21 0709 Date of Service: 21 165   : Pato Jacobs NP (Nurse Practitioner)          [address] [id number] [phone] FAX: [phone]     Vascular Surgery Consult     Subjective:      Katie Sadler is a 61 y.o. male who presented to the ER for evaluation of left leg pain, numbness and coldness that started yesterday around 2 pm. He is followed by Dr. Santi Bell at [place]. He apparently called their office but was advised that he needed to present to the urgent care as they could not see him in the office. He has undergone a right fem-pop bypass with GSV and left SFA stenosis s/p radial access with right bypass outflow 4mm angioplasty and left SFA 5mm angioplasty in Nov 2020. He had been on Coumadin and Plavix but it has been discontinued and he is only on ASA. He unfortuntaly continues to smoke and has no desire to stop. He is having extreme pain and numbness. He does get some mild improvement to his pain when he dangles his left leg off the bed. He denies COVID-19 and has not been vaccinated.      PMH: PAD s/p bypass and anioplasty, Osgood-Schlatter's disease        No past medical history on file. No past surgical history on file. No family history on file. Social History            Socioeconomic History    Marital status: SINGLE       Spouse name: Not on file    Number of children: Not on file    Years of education: Not on file    Highest education level: Not on file   Tobacco Use    Smoking status: Current Every Day Smoker       Types: Cigarettes    Smokeless tobacco: Never Used   Substance and Sexual Activity    Alcohol use: Yes    Drug use: No      Social Determinants of Health          Financial Resource Strain:     Difficulty of Paying Living Expenses:    Food Insecurity:     Worried About Running Out of Food in the Last Year:     [address] in the Last Year:    Transportation Needs:     Lack of Transportation (Medical):      Lack of Transportation (Non-Medical):    Physical Activity:     Days of Exercise per Week:     Minutes of Exercise per Session:    Stress:     Feeling of Stress :    Social Connections:     Frequency of Communication with Friends and Family:     Frequency of Social Gatherings with Friends and Family:     Attends Pentecostal Services:     Active Member of Clubs or Organizations:     Attends Club or Organization Meetings:     Marital Status:              Current Facility-Administered Medications   Medication Dose Route Frequency    heparin (porcine) injection 8,000 Units  80 Units/kg IntraVENous ONCE    heparin 25,000 units in dextrose 500 mL infusion  18-36 Units/kg/hr IntraVENous TITRATE      No current outpatient medications on file. No Known Allergies     Review of Systems:  A comprehensive review of systems was negative except for that written in the History of Present Illness.     Objective:      Patient Vitals for the past 8 hrs:    BP Temp Pulse Resp SpO2 Height Weight   21 1500 116/81 97.8 °F (36.6 °C) 77 18 97 % 6' 3\" (1.905 m) 220 lb (99.8 kg)      Temp (24hrs), Av.8 °F (36.6 °C), Min:97.8 °F (36.6 °C), Max:97.8 °F (36.6 °C)        Physical Exam:  GENERAL: alert, cooperative, no distress, appears stated age, LUNG: clear to auscultation bilaterally, HEART: regular rate and rhythm, S1, S2 normal, no murmur, click, rub or gallop, EXTREMITIES:  no edema, right foot warm, well perfused. Left lower leg and foot cool, non-palpable pulses on the left, decreased sensory, motor is intact     Assessment/Plan:       Patient is a 61year old male who presented with left leg pain, numbness and coolness that started yesterday unable to be seen by his primary vascular surgeon at [facility]. The ultrasound shows that his  femoral artery, popliteal, PTA and peroneal are all occluded on the left. Given that the patient has had multiple vascular interventions at [facility]. Dr. Alec Valdez has spoken with Dr. Ruby Villa and who has graciously agreed to accept him as a transfer to [facility] to address his left lower extremity ischemia.     Patient seen and examined with Dr. Dannette Spurling By: Adrian Patrick NP      2021       Elements of this note have been dictated using speech recognition software. As a result, errors of speech recognition may have occurred.       Patient Demographics    Patient Name   Humberto Byrd   [id number] Legal Sex   Male    1955 Address   [address] [place] [address] [address] [facility] streamit Phone   [phone] (Home)   [phone] (Mobile)   CSN:   [id number]   Admit Date: Admit Time Room Bed   Sep 23, 2021  3:50 PM FANTASMA BARRETT [facility] [226] H [1592]   Attending Providers    Provider Pager From To   Justin Nelson MD  21   Maru Narayan MD  21   Justin Nelson MD  21   Emergency Contact(s)    Name Relation Home Work Mobile   Rachel Oquendo    [phone]   Utilization Reviews       Vascular Disease GRG - Care Day 1 (2021) by Saloni Agrawal RN       Review Entered Review Status   2021 08:08 Completed      Criteria Review      Care Day: 1 Care Date: 2021 Level of Care:    Guideline Day 1    Level Of Care    ( ) ICU or intermediate care    2021 08:08:32 EDT by Gail Keller      ED    Clinical Status    (X) * Clinical Indications met    2021 08:08:32 EDT by Gail Keller      60 y/o male admitted with ischemia of left lower extremity and PAD    Interventions    (X) Inpatient interventions as needed    2021 08:08:32 EDT by Gail Keller      IV heparin, IV dilaudid, IV zofran    * Milestone   Additional Notes   ED note-61year-old male with history of peripheral artery disease presents with acute onset of left lower leg pain foot pain and numbness yesterday around 2 or 2:30 PM.  Was slightly improved last night when he would hang it off the bed or D elevated.  Worsening pain and numbness today.  Patient's vascular surgeon is at [facility] but he would like to try vascular surgery here at  [facility] [address] denies a history of aortic aneurysm.  He continues to smoke and takes a 81 mg aspirin daily.  No chest pain or trouble breathing.  No direct trauma to the leg.  The foot feels cold.       The history is provided by the patient. Leg Pain    This is a new problem.  The current episode started yesterday. The problem occurs constantly. The problem has been gradually worsening. Pain location: Left leg below the knee. The quality of the pain is described as aching. The pain is severe. Associated symptoms include numbness ( Left foot is numb). Pertinent negatives include full range of motion, no tingling, no back pain and no neck pain. He has tried nothing for the symptoms. There has been no history of extremity trauma      Skin: Positive for color change and pallor ( Left foot has less color and feels cold). Neurological: Positive for numbness ( Left foot is numb). Negative for tingling and weakness.            Vitals:     09/23/21 1500   BP: 116/81   Pulse: 77   Resp: 18   Temp: 97.8 °F (36.6 °C)   SpO2: 97%   Height: 6' 3\" (1.905 m)               Physical Exam   Vitals and nursing note reviewed. Constitutional:        Appearance: He is well-developed. HENT:       Mouth/Throat:       Pharynx: No oropharyngeal exudate. Eyes:       Conjunctiva/sclera: Conjunctivae normal.       Pupils: Pupils are equal, round, and reactive to light. Cardiovascular:       Rate and Rhythm: Normal rate and regular rhythm.       Pulses:            Carotid pulses are 2+ on the right side and 2+ on the left side.        Radial pulses are 2+ on the right side and 2+ on the left side.         Femoral pulses are 2+ on the right side and 1+ on the left side.        Popliteal pulses are 0 on the left side.         Dorsalis pedis pulses are 1+ on the right side and 0 on the left side.         Posterior tibial pulses are 1+ on the right side and 0 on the left side.       Heart sounds: Normal heart sounds.       Comments: Unable to detect posterior tibial or dorsalis pedis pulse with Doppler.  Popliteal pulse appreciated in triage. Pulmonary:       Effort: Pulmonary effort is normal.       Breath sounds: Normal breath sounds.     Abdominal:       General: Bowel sounds are normal. There is no distension.       Palpations: Abdomen is soft.       Tenderness: There is no abdominal tenderness. There is no guarding or rebound. Musculoskeletal:          General: No swelling or tenderness. Normal range of motion.       Cervical back: Neck supple.       Right lower leg: No edema.       Left lower leg: No edema. Lymphadenopathy:       Cervical: No cervical adenopathy. Skin:      General: Skin is warm and dry.       Coloration: Skin is pale ( Left foot feels more pale than the right foot). Neurological:       Mental Status: He is alert and oriented to person, place, and time.             MDM   Number of Diagnoses or Management Options   Diagnosis management comments: Ischemic left foot.  Arterial duplex ordered. Evan Jones discuss with vascular surgery.       4:31 PM   Immediately after my attempts at operating a pulse patient was taken to get a arterial duplex of the left leg.  I have communicated with vascular surgery and the vascular surgery PA will go see the patient over an ultrasound.  Upon his return we will obtain an IV check labs and start heparin bolus and drip.       5:33 PM   Savita Kahn with vascular surgery has seen the patient and talked with his vascular surgeon at [facility].  Dr. Julius Salguero is on-call for the patient's vascular surgeon has agreed to accept him in transfer. Salome Paulson will speak to him myself through the referral line and fill out the EMTALA form.  Apparently they have a bed for him already.        Results for Nichol Rosales (MRN [id number]) as of 9/24/2021 08:09      9/23/2021 17:42   WBC: 8.9   RBC: 4.81   HGB: 15.3   HCT: 47.4   MCV: 98.5 (H)   MCH: 31.8   MCHC: 32.3   RDW: 14.5   PLATELET: 079   MPV: 9.8   NEUTROPHILS: 65   LYMPHOCYTES: 24   MONOCYTES: 9   EOSINOPHILS: 1   BASOPHILS: 1   IMMATURE GRANULOCYTES: 0   DF: AUTOMATED   ABSOLUTE NRBC: 0.00   ABS. NEUTROPHILS: 5.8   ABS. IMM. GRANS.: 0.0   ABS. LYMPHOCYTES: 2.1   ABS. MONOCYTES: 0.8   ABS. EOSINOPHILS: 0.1   ABS. BASOPHILS: 0.1   Results for Ed Pena (MRN [id number]) as of 9/24/2021 08:09      9/23/2021 17:42   aPTT: 26.7   Results for Ed Pena (MRN [id number]) as of 9/24/2021 08:09      9/23/2021 17:42   Sodium: 140   Potassium: 4.2   Chloride: 107   CO2: 27   Anion gap: 6 (L)   Glucose: 83   BUN: 13   Creatinine: 0.56 (L)   Calcium: 9.0   GFR est non-AA: >60   GFR est AA: >60   Bilirubin, total: 0.4   Protein, total: 7.2   Albumin: 3.3   Globulin: 3.9 (H)   A-G Ratio: 0.8 (L)   ALT: 14   AST: 11 (L)   Alk. phosphatase: 95      Amount and/or Complexity of Data Reviewed   Clinical lab tests: ordered and reviewed   Tests in the radiology section of CPT®: ordered and reviewed       Risk of Complications, Morbidity, and/or Mortality   Presenting problems: high   Diagnostic procedures: low   Management options: high   General comments: CRITICAL CARE Documentation: This patient is critically ill and there is a high probability of of imminent or life threatening deterioration in the patient's condition without immediate management. Medications,   heparin (porcine) injection 8,000 Units    Dose: 80 Units/kg   Weight Dosing Info: 99.8 kg   Freq: ONCE Route: IV      HYDROmorphone (DILAUDID) injection 1 mg    Dose: 1 mg   Freq: NOW Route: IV      ondansetron (ZOFRAN) injection 4 mg    Dose: 4 mg   Freq: NOW Route: IV      heparin 25,000 units in dextrose 500 mL infusion    Rate: 35.9-71.9 mL/hr Dose: 18-36 Units/kg/hr   Weight Dosing Info: 99.8 kg   Freq: TITRATE Route: IV      Assessment/Plan:        Patient is a 61year old male who presented with left leg pain, numbness and coolness that started yesterday unable to be seen by his primary vascular surgeon at [facility]. The ultrasound shows that his  femoral artery, popliteal, PTA and peroneal are all occluded on the left. Given that the patient has had multiple vascular interventions at [facility].  Dr. Mathew Zavala has spoken with Dr. Marlen Marie and who has graciously agreed to accept him as a transfer to [facility] to address his left lower extremity ischemia.                Vascular Disease GRG - Clinical Indications for Admission to Inpatient Care by Callie Babin RN       Review Entered Review Status   9/24/2021 08:07 Completed      Criteria Review      Clinical Indications for Admission to Inpatient Care    Most Recent : Roberto Shin Most Recent Date: 9/24/2021 08:07:06 EDT    (X) Hospital admission is needed for appropriate care of the patient because of  1 or more  of    the following  (1) (2) (3) (4) (5):       (X) Acute ischemia due to peripheral vascular disease, as indicated by  1 or more  of the following        (6) (7) (8) (9) (10) (11) (12):          (X) Severe pain          9/24/2021 08:07:06 EDT by Roberto Shin            presents with acute onset of left lower leg pain foot pain and numbness yesterday around 2 or 2:30 PM.